# Patient Record
Sex: FEMALE | NOT HISPANIC OR LATINO
[De-identification: names, ages, dates, MRNs, and addresses within clinical notes are randomized per-mention and may not be internally consistent; named-entity substitution may affect disease eponyms.]

---
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
## 2023-01-06 ENCOUNTER — APPOINTMENT (OUTPATIENT)
Dept: NEUROLOGY | Facility: CLINIC | Age: 18
End: 2023-01-06

## 2023-03-03 ENCOUNTER — APPOINTMENT (OUTPATIENT)
Dept: NEUROLOGY | Facility: CLINIC | Age: 18
End: 2023-03-03
Payer: MEDICAID

## 2023-03-03 ENCOUNTER — TRANSCRIPTION ENCOUNTER (OUTPATIENT)
Age: 18
End: 2023-03-03

## 2023-03-03 DIAGNOSIS — F90.9 ATTENTION-DEFICIT HYPERACTIVITY DISORDER, UNSPECIFIED TYPE: ICD-10-CM

## 2023-03-03 PROCEDURE — 99212 OFFICE O/P EST SF 10 MIN: CPT | Mod: 95

## 2023-03-04 PROBLEM — F90.9 ADHD: Status: ACTIVE | Noted: 2022-08-30

## 2023-03-04 NOTE — HISTORY OF PRESENT ILLNESS
[FreeTextEntry1] : 17-year-old woman seen 6 months ago complaining of "anxiety".  Upon further questioning she reveals that she very often procrastinates.  She very often is fidgeting and squirming if she has to sit down for a long time.  She admits to feeling compelled to do things as if she was driven by a motor.  She has difficulty keeping attention while doing boring work.  She very often has difficulty concentrating on what people say to her.  She very often is distracted by activity or noise around her.  She very often has difficulty unwinding and relaxing when she has time to herself and she very often finds herself finishing the sentences of the people she is talking to.\par \par Upon further questioning she reveals that her older sister and mother have similar symptoms.\par \par She denies any other health concerns. \par \par \par She was diagnosed with ADHD and started on amphetamine salts 10 mg twice daily with excellent response.  Denies any adverse effects.  Since starting amphetamine-dextroamphetamine 10 mg twice daily her anxiety resolved.

## 2023-03-04 NOTE — DISCUSSION/SUMMARY
[FreeTextEntry1] : She was advised that I am leaving [place] well in 4 weeks and she will need to follow-up with one of my colleagues.

## 2023-03-04 NOTE — REASON FOR VISIT
[Home] : at home, [unfilled] , at the time of the visit. [Medical Office: ([facility])___] : at the medical office located in  [Patient] : the patient [Follow-Up: _____] : a [unfilled] follow-up visit

## 2023-03-31 RX ORDER — DEXTROAMPHETAMINE SACCHARATE, AMPHETAMINE ASPARTATE, DEXTROAMPHETAMINE SULFATE AND AMPHETAMINE SULFATE 2.5; 2.5; 2.5; 2.5 MG/1; MG/1; MG/1; MG/1
10 TABLET ORAL
Qty: 60 | Refills: 0 | Status: ACTIVE | COMMUNITY
Start: 2022-08-30 | End: 1900-01-01

## 2023-09-28 ENCOUNTER — APPOINTMENT (OUTPATIENT)
Dept: OPHTHALMOLOGY | Facility: CLINIC | Age: 18
End: 2023-09-28

## 2023-10-11 ENCOUNTER — NON-APPOINTMENT (OUTPATIENT)
Age: 18
End: 2023-10-11

## 2023-10-11 ENCOUNTER — APPOINTMENT (OUTPATIENT)
Dept: OPHTHALMOLOGY | Facility: CLINIC | Age: 18
End: 2023-10-11
Payer: MEDICAID

## 2023-10-11 PROCEDURE — 92004 COMPRE OPH EXAM NEW PT 1/>: CPT

## 2023-10-11 PROCEDURE — 76514 ECHO EXAM OF EYE THICKNESS: CPT

## 2023-10-11 PROCEDURE — 92133 CPTRZD OPH DX IMG PST SGM ON: CPT

## 2023-10-11 PROCEDURE — 92015 DETERMINE REFRACTIVE STATE: CPT | Mod: NC

## 2024-04-11 ENCOUNTER — APPOINTMENT (OUTPATIENT)
Dept: OPHTHALMOLOGY | Facility: CLINIC | Age: 19
End: 2024-04-11